# Patient Record
Sex: MALE | Race: WHITE | HISPANIC OR LATINO | Employment: FULL TIME | URBAN - METROPOLITAN AREA
[De-identification: names, ages, dates, MRNs, and addresses within clinical notes are randomized per-mention and may not be internally consistent; named-entity substitution may affect disease eponyms.]

---

## 2017-12-16 ENCOUNTER — APPOINTMENT (OUTPATIENT)
Dept: RADIOLOGY | Facility: MEDICAL CENTER | Age: 32
End: 2017-12-16
Attending: EMERGENCY MEDICINE

## 2017-12-16 ENCOUNTER — HOSPITAL ENCOUNTER (OUTPATIENT)
Facility: MEDICAL CENTER | Age: 32
End: 2017-12-16
Attending: EMERGENCY MEDICINE | Admitting: PLASTIC SURGERY

## 2017-12-16 VITALS
HEART RATE: 85 BPM | TEMPERATURE: 98.9 F | DIASTOLIC BLOOD PRESSURE: 92 MMHG | WEIGHT: 176.15 LBS | OXYGEN SATURATION: 95 % | RESPIRATION RATE: 18 BRPM | SYSTOLIC BLOOD PRESSURE: 151 MMHG | BODY MASS INDEX: 26.7 KG/M2 | HEIGHT: 68 IN

## 2017-12-16 DIAGNOSIS — S02.92XA MULTIPLE CLOSED FRACTURES OF FACIAL BONE, INITIAL ENCOUNTER (HCC): ICD-10-CM

## 2017-12-16 DIAGNOSIS — S02.2XXB OPEN FRACTURE OF NASAL BONE, INITIAL ENCOUNTER: ICD-10-CM

## 2017-12-16 DIAGNOSIS — S01.81XA FACIAL LACERATION, INITIAL ENCOUNTER: ICD-10-CM

## 2017-12-16 DIAGNOSIS — S61.411A LACERATION OF RIGHT HAND, FOREIGN BODY PRESENCE UNSPECIFIED, INITIAL ENCOUNTER: ICD-10-CM

## 2017-12-16 PROCEDURE — 90715 TDAP VACCINE 7 YRS/> IM: CPT | Performed by: EMERGENCY MEDICINE

## 2017-12-16 PROCEDURE — 99285 EMERGENCY DEPT VISIT HI MDM: CPT

## 2017-12-16 PROCEDURE — 160036 HCHG PACU - EA ADDL 30 MINS PHASE I: Performed by: PLASTIC SURGERY

## 2017-12-16 PROCEDURE — 96374 THER/PROPH/DIAG INJ IV PUSH: CPT

## 2017-12-16 PROCEDURE — 160048 HCHG OR STATISTICAL LEVEL 1-5: Performed by: PLASTIC SURGERY

## 2017-12-16 PROCEDURE — 700102 HCHG RX REV CODE 250 W/ 637 OVERRIDE(OP): Performed by: EMERGENCY MEDICINE

## 2017-12-16 PROCEDURE — 160029 HCHG SURGERY MINUTES - 1ST 30 MINS LEVEL 4: Performed by: PLASTIC SURGERY

## 2017-12-16 PROCEDURE — A9270 NON-COVERED ITEM OR SERVICE: HCPCS | Performed by: EMERGENCY MEDICINE

## 2017-12-16 PROCEDURE — 501330 HCHG SET, CYSTO IRRIG TUBING: Performed by: PLASTIC SURGERY

## 2017-12-16 PROCEDURE — 72125 CT NECK SPINE W/O DYE: CPT

## 2017-12-16 PROCEDURE — 73130 X-RAY EXAM OF HAND: CPT | Mod: RT

## 2017-12-16 PROCEDURE — 502000 HCHG MISC OR IMPLANTS RC 0278: Performed by: PLASTIC SURGERY

## 2017-12-16 PROCEDURE — A6222 GAUZE <=16 IN NO W/SAL W/O B: HCPCS | Performed by: PLASTIC SURGERY

## 2017-12-16 PROCEDURE — 160009 HCHG ANES TIME/MIN: Performed by: PLASTIC SURGERY

## 2017-12-16 PROCEDURE — 700105 HCHG RX REV CODE 258: Performed by: EMERGENCY MEDICINE

## 2017-12-16 PROCEDURE — 700102 HCHG RX REV CODE 250 W/ 637 OVERRIDE(OP)

## 2017-12-16 PROCEDURE — 501404 HCHG SPLINT, NASAL DOYLE AIRWAY: Performed by: PLASTIC SURGERY

## 2017-12-16 PROCEDURE — A9270 NON-COVERED ITEM OR SERVICE: HCPCS

## 2017-12-16 PROCEDURE — 700111 HCHG RX REV CODE 636 W/ 250 OVERRIDE (IP): Performed by: EMERGENCY MEDICINE

## 2017-12-16 PROCEDURE — 700111 HCHG RX REV CODE 636 W/ 250 OVERRIDE (IP)

## 2017-12-16 PROCEDURE — 70450 CT HEAD/BRAIN W/O DYE: CPT

## 2017-12-16 PROCEDURE — G0378 HOSPITAL OBSERVATION PER HR: HCPCS

## 2017-12-16 PROCEDURE — 500331 HCHG COTTONOID, SURG PATTIE: Performed by: PLASTIC SURGERY

## 2017-12-16 PROCEDURE — 90471 IMMUNIZATION ADMIN: CPT

## 2017-12-16 PROCEDURE — 501838 HCHG SUTURE GENERAL: Performed by: PLASTIC SURGERY

## 2017-12-16 PROCEDURE — 96375 TX/PRO/DX INJ NEW DRUG ADDON: CPT

## 2017-12-16 PROCEDURE — 501403 HCHG SPLINT, NASAL DENVER: Performed by: PLASTIC SURGERY

## 2017-12-16 PROCEDURE — 160041 HCHG SURGERY MINUTES - EA ADDL 1 MIN LEVEL 4: Performed by: PLASTIC SURGERY

## 2017-12-16 PROCEDURE — 160002 HCHG RECOVERY MINUTES (STAT): Performed by: PLASTIC SURGERY

## 2017-12-16 PROCEDURE — 500126 HCHG BOVIE, NEEDLE TIP: Performed by: PLASTIC SURGERY

## 2017-12-16 PROCEDURE — 160035 HCHG PACU - 1ST 60 MINS PHASE I: Performed by: PLASTIC SURGERY

## 2017-12-16 PROCEDURE — 700101 HCHG RX REV CODE 250

## 2017-12-16 PROCEDURE — 70486 CT MAXILLOFACIAL W/O DYE: CPT

## 2017-12-16 DEVICE — IMPLANTABLE DEVICE: Type: IMPLANTABLE DEVICE | Status: FUNCTIONAL

## 2017-12-16 RX ORDER — AMOXICILLIN AND CLAVULANATE POTASSIUM 875; 125 MG/1; MG/1
1 TABLET, FILM COATED ORAL ONCE
Status: COMPLETED | OUTPATIENT
Start: 2017-12-16 | End: 2017-12-16

## 2017-12-16 RX ORDER — SODIUM CHLORIDE 9 MG/ML
INJECTION, SOLUTION INTRAVENOUS CONTINUOUS
Status: DISCONTINUED | OUTPATIENT
Start: 2017-12-16 | End: 2017-12-16

## 2017-12-16 RX ORDER — LIDOCAINE HYDROCHLORIDE 10 MG/ML
INJECTION, SOLUTION INFILTRATION; PERINEURAL
Status: COMPLETED
Start: 2017-12-16 | End: 2017-12-16

## 2017-12-16 RX ORDER — AMOXICILLIN AND CLAVULANATE POTASSIUM 875; 125 MG/1; MG/1
1 TABLET, FILM COATED ORAL 2 TIMES DAILY
Qty: 14 TAB | Refills: 0 | Status: SHIPPED | OUTPATIENT
Start: 2017-12-16

## 2017-12-16 RX ORDER — ONDANSETRON 2 MG/ML
4 INJECTION INTRAMUSCULAR; INTRAVENOUS EVERY 4 HOURS PRN
Status: DISCONTINUED | OUTPATIENT
Start: 2017-12-16 | End: 2017-12-16 | Stop reason: HOSPADM

## 2017-12-16 RX ORDER — ONDANSETRON 2 MG/ML
4 INJECTION INTRAMUSCULAR; INTRAVENOUS
Status: COMPLETED | OUTPATIENT
Start: 2017-12-16 | End: 2017-12-16

## 2017-12-16 RX ORDER — SODIUM CHLORIDE 9 MG/ML
INJECTION, SOLUTION INTRAVENOUS CONTINUOUS
Status: DISCONTINUED | OUTPATIENT
Start: 2017-12-16 | End: 2017-12-16 | Stop reason: HOSPADM

## 2017-12-16 RX ORDER — SODIUM CHLORIDE, SODIUM LACTATE, POTASSIUM CHLORIDE, CALCIUM CHLORIDE 600; 310; 30; 20 MG/100ML; MG/100ML; MG/100ML; MG/100ML
INJECTION, SOLUTION INTRAVENOUS
Status: ACTIVE | OUTPATIENT
Start: 2017-12-16 | End: 2017-12-16

## 2017-12-16 RX ORDER — OXYCODONE HYDROCHLORIDE 10 MG/1
10 TABLET ORAL
Status: DISCONTINUED | OUTPATIENT
Start: 2017-12-16 | End: 2017-12-16 | Stop reason: HOSPADM

## 2017-12-16 RX ORDER — MORPHINE SULFATE 10 MG/ML
5 INJECTION, SOLUTION INTRAMUSCULAR; INTRAVENOUS
Status: DISCONTINUED | OUTPATIENT
Start: 2017-12-16 | End: 2017-12-16 | Stop reason: HOSPADM

## 2017-12-16 RX ORDER — ACETAMINOPHEN 650 MG/1
650 SUPPOSITORY RECTAL EVERY 4 HOURS PRN
Status: DISCONTINUED | OUTPATIENT
Start: 2017-12-16 | End: 2017-12-16 | Stop reason: HOSPADM

## 2017-12-16 RX ORDER — BUPIVACAINE HYDROCHLORIDE AND EPINEPHRINE 5; 5 MG/ML; UG/ML
INJECTION, SOLUTION EPIDURAL; INTRACAUDAL; PERINEURAL
Status: DISCONTINUED | OUTPATIENT
Start: 2017-12-16 | End: 2017-12-16 | Stop reason: HOSPADM

## 2017-12-16 RX ORDER — OXYCODONE AND ACETAMINOPHEN 10; 325 MG/1; MG/1
1-2 TABLET ORAL EVERY 4 HOURS PRN
Qty: 25 TAB | Refills: 0 | Status: SHIPPED | OUTPATIENT
Start: 2017-12-16 | End: 2017-12-21

## 2017-12-16 RX ORDER — ACETAMINOPHEN 325 MG/1
325 TABLET ORAL EVERY 4 HOURS PRN
Status: DISCONTINUED | OUTPATIENT
Start: 2017-12-16 | End: 2017-12-16 | Stop reason: HOSPADM

## 2017-12-16 RX ORDER — HYDROMORPHONE HYDROCHLORIDE 2 MG/ML
1 INJECTION, SOLUTION INTRAMUSCULAR; INTRAVENOUS; SUBCUTANEOUS
Status: COMPLETED | OUTPATIENT
Start: 2017-12-16 | End: 2017-12-16

## 2017-12-16 RX ADMIN — SODIUM CHLORIDE: 9 INJECTION, SOLUTION INTRAVENOUS at 10:00

## 2017-12-16 RX ADMIN — CLOSTRIDIUM TETANI TOXOID ANTIGEN (FORMALDEHYDE INACTIVATED), CORYNEBACTERIUM DIPHTHERIAE TOXOID ANTIGEN (FORMALDEHYDE INACTIVATED), BORDETELLA PERTUSSIS TOXOID ANTIGEN (GLUTARALDEHYDE INACTIVATED), BORDETELLA PERTUSSIS FILAMENTOUS HEMAGGLUTININ ANTIGEN (FORMALDEHYDE INACTIVATED), BORDETELLA PERTUSSIS PERTACTIN ANTIGEN, AND BORDETELLA PERTUSSIS FIMBRIAE 2/3 ANTIGEN 0.5 ML: 5; 2; 2.5; 5; 3; 5 INJECTION, SUSPENSION INTRAMUSCULAR at 06:35

## 2017-12-16 RX ADMIN — HYDROMORPHONE HYDROCHLORIDE 1 MG: 2 INJECTION INTRAMUSCULAR; INTRAVENOUS; SUBCUTANEOUS at 11:28

## 2017-12-16 RX ADMIN — ONDANSETRON 4 MG: 2 INJECTION INTRAMUSCULAR; INTRAVENOUS at 11:27

## 2017-12-16 RX ADMIN — AMOXICILLIN AND CLAVULANATE POTASSIUM 1 TABLET: 875; 125 TABLET, FILM COATED ORAL at 07:00

## 2017-12-16 RX ADMIN — SODIUM CHLORIDE: 9 INJECTION, SOLUTION INTRAVENOUS at 12:22

## 2017-12-16 RX ADMIN — LIDOCAINE HYDROCHLORIDE: 10 INJECTION, SOLUTION INFILTRATION; PERINEURAL at 06:30

## 2017-12-16 ASSESSMENT — LIFESTYLE VARIABLES
EVER_SMOKED: NEVER
ALCOHOL_USE: YES
HAVE PEOPLE ANNOYED YOU BY CRITICIZING YOUR DRINKING: NO
CONSUMPTION TOTAL: POSITIVE
TOTAL SCORE: 0
TOTAL SCORE: 0
HAVE YOU EVER FELT YOU SHOULD CUT DOWN ON YOUR DRINKING: NO
AVERAGE NUMBER OF DAYS PER WEEK YOU HAVE A DRINK CONTAINING ALCOHOL: 1
TOTAL SCORE: 0
EVER HAD A DRINK FIRST THING IN THE MORNING TO STEADY YOUR NERVES TO GET RID OF A HANGOVER: NO
ON A TYPICAL DAY WHEN YOU DRINK ALCOHOL HOW MANY DRINKS DO YOU HAVE: 7
HOW MANY TIMES IN THE PAST YEAR HAVE YOU HAD 5 OR MORE DRINKS IN A DAY: 10
EVER FELT BAD OR GUILTY ABOUT YOUR DRINKING: NO

## 2017-12-16 ASSESSMENT — COGNITIVE AND FUNCTIONAL STATUS - GENERAL
SUGGESTED CMS G CODE MODIFIER MOBILITY: CH
EATING MEALS: A LOT
SUGGESTED CMS G CODE MODIFIER DAILY ACTIVITY: CJ
MOBILITY SCORE: 24
DAILY ACTIVITIY SCORE: 22

## 2017-12-16 ASSESSMENT — PAIN SCALES - GENERAL
PAINLEVEL_OUTOF10: 5
PAINLEVEL_OUTOF10: 2
PAINLEVEL_OUTOF10: 2
PAINLEVEL_OUTOF10: 0
PAINLEVEL_OUTOF10: 1
PAINLEVEL_OUTOF10: 0
PAINLEVEL_OUTOF10: 7
PAINLEVEL_OUTOF10: 0

## 2017-12-16 ASSESSMENT — PATIENT HEALTH QUESTIONNAIRE - PHQ9
1. LITTLE INTEREST OR PLEASURE IN DOING THINGS: NOT AT ALL
SUM OF ALL RESPONSES TO PHQ QUESTIONS 1-9: 0
2. FEELING DOWN, DEPRESSED, IRRITABLE, OR HOPELESS: NOT AT ALL
SUM OF ALL RESPONSES TO PHQ9 QUESTIONS 1 AND 2: 0
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
## 2017-12-16 NOTE — ED NOTES
"Pts friend states, \"he can't talk he needs lots of pain meds for his body\".  I ask the patient if he is in pain an he reports in my left hand at an 8/10.  Pts can speak in full sentences, denies difficulty speaking and states he no trouble speaking.  I politely explain that I have to assess the patient and I appreciate the help from the friend.  Family is pleased and patient is resting comfortably.   "
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
## 2017-12-16 NOTE — OR SURGEON
Immediate Post OP Note    PreOp Diagnosis: left medial orbital wall fracture, complex nasal/septal fractures, laceration left eyelid and forehead, right open hand wound    PostOp Diagnosis: same    Procedure(s):  ORBITAL FRACTURE ORIF  NASAL FRACTURE REDUCTION OPEN  Washout and closure complex right hand wound and EIP repair  Closure left eyelid and forehead wounds    Surgeon(s):  Rodríguez Garcia M.D.    Anesthesiologist/Type of Anesthesia:  Anesthesiologist: Cesar Farfan M.D./* No anesthesia type entered *    Surgical Staff:  Circulator: Jody Lino R.N.; Laura Argueta R.N.  Scrub Person: Kg Hammer    Specimens:  * No specimens in log *    Estimated Blood Loss: minimal    Findings: see operative note    Complications: no apparent    #[id number]    12/16/2017 1:35 PM Rodríguez Garcia

## 2017-12-16 NOTE — ED NOTES
Med rec complete per patient.  Pt denies taking any medication, OTC or Rx.  Allergies reviewed - NKDA.
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
## 2017-12-17 NOTE — DISCHARGE INSTRUCTIONS
"Orbital Floor Fracture, Non-Blowout  The eye sits in the part of the skull called the \"orbit.\" The upper and outside walls of the orbit are thick and strong. The inside wall (near the nose) and the orbital floor are very thin and weak. The tissues around the eye will briefly press together if there is a direct blow to the front of the eye. This leads to high pressure against the orbital walls. The inside wall and the orbital floor may break since these are the weakest walls. If the orbital floor breaks, the tissues around the eye, including the muscle that makes the eye look down, may become trapped in the sinus below when the orbital floor \"blows out.\" If a blowout does not happen, the orbital floor fracture is considered a non-blowout orbital fracture.  CAUSES  An orbital floor fracture can be caused by any accident in which an object hits the face or the face strikes against a hard object. The most common ways that people break their eye socket include:  Being hit by a blunt object, such as a baseball bat or a fist.Discharge Instructions    Discharged to home by car with relative. Discharged via wheelchair, hospital escort: Refused.  Special equipment needed: Not Applicable    Be sure to schedule a follow-up appointment with your primary care doctor or any specialists as instructed.     Discharge Plan:   Influenza Vaccine Indication: Patient Refuses    I understand that a diet low in cholesterol, fat, and sodium is recommended for good health. Unless I have been given specific instructions below for another diet, I accept this instruction as my diet prescription.   Other diet: Regular as tolerated     Special Instructions: None    · Is patient discharged on Warfarin / Coumadin?   No     · Is patient Post Blood Transfusion?  No    Depression / Suicide Risk    As you are discharged from this [facility] facility, it is important to learn how to keep safe from harming yourself.    Recognize the warning " signs:  · Abrupt changes in personality, positive or negative- including increase in energy   · Giving away possessions  · Change in eating patterns- significant weight changes-  positive or negative  · Change in sleeping patterns- unable to sleep or sleeping all the time   · Unwillingness or inability to communicate  · Depression  · Unusual sadness, discouragement and loneliness  · Talk of wanting to die  · Neglect of personal appearance   · Rebelliousness- reckless behavior  · Withdrawal from people/activities they love  · Confusion- inability to concentrate     If you or a loved one observes any of these behaviors or has concerns about self-harm, here's what you can do:  · Talk about it- your feelings and reasons for harming yourself  · Remove any means that you might use to hurt yourself (examples: pills, rope, extension cords, firearm)  · Get professional help from the community (Mental Health, Substance Abuse, psychological counseling)  · Do not be alone:Call your Safe Contact- someone whom you trust who will be there for you.  · Call your local CRISIS HOTLINE 653-5093 or 080-106-3705  · Call your local Children's Mobile Crisis Response Team [place] (495) 864-7447 or www.[employer]  · Call the toll free National Suicide Prevention Hotlines   · National Suicide Prevention Lifeline 750-938-ULGI (1059)  · National Hope Line Network 800-SUICIDE (997-0694)    ·   · Striking the face on the car dashboard during a crash.  · Falls.  Gunshot.  Nasal Surgery  Care After  These instructions give you information on caring for yourself after your procedure. Your doctor may also give you more specific instructions. Call your doctor if you have any problems or questions after your procedure.  HOME CARE  · Sleep with your head up on 2 or 3 pillows.  · Hold a cold, damp wash cloth to your face, eyes, and nose for 20 minutes, every 2 hours during the day.  · Wash your face very gently with mild soap and water 2  times a day.  · Change your nasal drip pad as often as you need to.  · Do not bend over or lift more than 10 pounds (about the weight of a gallon of milk).  · Do not chew gum or eat any food that needs a lot of chewing.  · Do not bump or hurt your nose.  · Do not blow your nose for 1 week.  · Do not sniff or sneeze unless your mouth is open.  · Be sure to keep your follow-up appointment.  GET HELP RIGHT AWAY IF:   · You have any questions or concerns.  · You or your child has a temperature by mouth about 102° F (38.9° C).  · You notice bad smelling fluid (drainage) coming from the surgery site (incision) or your nose.  MAKE SURE YOU:  · Understand these instructions.  · Will watch your condition.  · Will get help right away if you are not doing well or get worse.  Document Released: 03/16/2010 Document Revised: 03/11/2013 Document Reviewed: 03/16/2010  [email or web address]® Patient Information ©2014 [employer].  Nasal Septal Reconstruction  Nasal septal reconstruction or nasal septoplasty is a procedure to straighten the nasal septum. The nasal septum is a wall that separates the two nostrils and nasal passages. It is slightly off center in most people. If the septum is severely deviated, it may result in problems, such as difficulty in breathing through the nose. The bend in the septum may be present at birth or could be due to an injury. This procedure is done if you have any of the following problems.  · Deviation of the nasal septum.  · Repeated infection of the sinuses (air-filled cavities in the skull).  · Pain due to the deviated septum.  · Loss of smell due to the deviated septum.  · A blood clot in the septum that interferes your breathing.  · Frequent nosebleeds.  If the outside of the nose is bent, it may have to be reconstructed by a surgery called rhinoplasty. Sometimes, this procedure may be combined with septoplasty.  LET YOUR CAREGIVER KNOW ABOUT:   · Allergies.  · Previous problems with  anesthetics.  · History of bleeding or blood problems.  · Any medicines that you are currently taking.  RISKS AND COMPLICATIONS  · You may have a hole in the septum.  · You may have a collection of blood in the septum.  · You may develop loss of sensation in the upper lip or teeth.  · You may develop an infection.  · You may have bleeding.  · The front portion of your nose may become flatter than what it was before the procedure.  · You may develop a reaction to the anesthetic used.  · You may have a recurrence of the nasal obstruction.  BEFORE THE PROCEDURE   · Follow the instructions given by your caregiver.  · Your caregiver may recommend x-ray and blood tests.  · Your caregiver may advise you to stop smoking for at least 2 weeks before the procedure.  · Your caregiver may advise you to stop taking aspirin and anti-inflammatory medications such as ibuprofen, 10 days before surgery, as these medicines can cause bleeding.  If the surgery is going to be under general anesthesia:  · You may be advised to eat only a light meal, such as soup or salad the previous night.  · You will be advised to avoid eating or drinking anything after midnight and also in the morning of the procedure.  PROCEDURE   If the procedure is being done under general anesthesia, you may be put to sleep. You will not feel the pain. You will not be aware of the procedure. It can also be done under local anesthesia with sedation where the area of the surgery is numbed. The surgeon then makes a cut on the inner lining of the septum. If there is a blood clot, it is drained. The bone and cartilage of the septum are reshaped. The straightened septum is held in place using plastic sheets or splints. Your nose is then packed with gauze to control the bleeding. The procedure may take one to one and a half hours. It generally does not cause bruising or black eyes.  AFTER THE PROCEDURE   · You may be kept in the recovery room till the effect of the  anesthesia wears off.  · You may be then brought to your room in the hospital.  · You may be asked to breathe through your mouth.  · Your nose packing may need to stay in place for 3 to 4 days.  · You may be given medicines for discomfort and nausea.  · You may be given antibiotics.  · You may be allowed to go home on the same day or have to stay in the hospital for a few days.  HOME CARE INSTRUCTIONS   · Do not blow your nose.  · Avoid doing heavy work and strenuous exercise for at least one week after the procedure.  · Avoid pushing or moving your nose before it heals.  · Avoid lifting weight and bending forwards.  · Avoid using products that contain aspirin.  · Keep your head raised while lying down.  · Take the medicines as instructed by your caregiver.  · Inform your caregiver if you have any problems after taking your medicine.  SEEK MEDICAL CARE IF:   · You have a new symptom.  · You have doubts regarding the procedure or its outcome.  SEEK IMMEDIATE MEDICAL CARE IF:   · You develop fever over 102° F (38.9° C).  · You have severe difficulty in breathing.  · Your nose continues to bleed even after you keep your head raised and apply ice to your forehead and nose for 10 to 15 minutes.  Document Released: 03/26/2009 Document Revised: 03/11/2013 Document Reviewed: 03/26/2009  [employer]® Patient Information ©2014 [employer]  ·   SYMPTOMS   If there has been no injury to the eye itself, symptoms may include:  · Puffiness (swelling) and bruising around the eye area (black eye).  · Numbness of the cheek and upper gum on the side with the floor fracture. This is caused by nerve injury to these areas.  · Pain around the eye.  · Headache.  · Ear pain on the injured side.  DIAGNOSIS  The diagnosis of an orbital floor fracture is suspected during an eye exam by an ophthalmologist. It is confirmed by X-rays or CT scan.  TREATMENT  Your caregiver may suggest waiting 1 or 2 weeks for the swelling to go away before  examining the eye. When the swelling lessens, your caregiver will examine the eye to see if there is any sign of a trapped muscle or double vision when looking in different directions. If double vision is not found and muscle or tissue did not get trapped, no further treatment is necessary. After that, in almost all cases, the bones heal together on their own.   HOME CARE INSTRUCTIONS  · Take all pain medicine as directed by your caregiver.  · Use ice packs or other cold therapy to reduce swelling as directed by your caregiver.  · Do not put a contact lens in the injured eye until your caregiver approves.  · Avoid [place] environments.  · Always wear protective glasses or goggles when recommended. Wearing protective eyewear is not dangerous to your injured eye and will not delay healing.  · As long as your other eye is seeing normally, you may return to work and drive.  · You may travel by plane or be in high altitudes. However, your swelling may take longer to go away, and you may have sinus pain.  · Be aware that your depth perception and your ability to  distance may be reduced or lost.  SEEK IMMEDIATE MEDICAL CARE IF:  · Your vision changes.  · Your redness or swelling persists around the injured eye or gets worse.  · You start to have double vision.  · You have a bloody or discolored discharge from your nose.  · You have a fever that lasts longer than 2 to 3 days.  · You have a fever that suddenly gets worse.  · Your cheek or upper gum numbness does not go away.  MAKE SURE YOU:  · Understand these instructions.  · Will watch your condition.  · Will get help right away if you are not doing well or get worse.     This information is not intended to replace advice given to you by your health care provider. Make sure you discuss any questions you have with your health care provider.     Document Released: 03/11/2013 Document Revised: 01/08/2016 Document Reviewed: 03/11/2013  [employer]  Education ©2016 [employer]

## (undated) DEVICE — PATTIES SURG X-RAYCOTTONOID - 1/2 X 3 IN (200/CA)

## (undated) DEVICE — SLEEVE, VASO, THIGH, MED

## (undated) DEVICE — SPLINT NASAL DENVER SM -SERIES 4000 (5EA/BX)

## (undated) DEVICE — NEEDLE NON SAFETY 25 GA X 1 1/2 IN HYPO (100EA/BX)

## (undated) DEVICE — TUBING CLEARLINK DUO-VENT - C-FLO (48EA/CA)

## (undated) DEVICE — ELECTRODE DUAL RETURN W/ CORD - (50/PK)

## (undated) DEVICE — BOVIE NEEDLE TIP INSULATD NON-SAFETY 2CM (50/PK)

## (undated) DEVICE — HEAD HOLDER JUNIOR/ADULT

## (undated) DEVICE — TOWELS CLOTH SURGICAL - (4/PK 20PK/CA)

## (undated) DEVICE — SET IRRIGATION CYSTOSCOPY TUBE L80 IN (20EA/CA)

## (undated) DEVICE — SUTURE 5-0 VICRYL PLUS P-3 18 (36PK/BX)"

## (undated) DEVICE — PROTECTOR CORNEAL - (10/BX)

## (undated) DEVICE — BLADE SURGICAL #15 - (50/BX 3BX/CA)

## (undated) DEVICE — ELECTRODE 850 FOAM ADHESIVE - HYDROGEL RADIOTRNSPRNT (50/PK)

## (undated) DEVICE — CORDS BIPOLAR COAGULATION - 12FT STERILE DISP. (10EA/BX)

## (undated) DEVICE — SUTURE GENERAL

## (undated) DEVICE — GLOVE BIOGEL SZ 7.5 SURGICAL PF LTX - (50PR/BX 4BX/CA)

## (undated) DEVICE — CANISTER SUCTION 3000ML MECHANICAL FILTER AUTO SHUTOFF MEDI-VAC NONSTERILE LF DISP  (40EA/CA)

## (undated) DEVICE — GLOVE BIOGEL INDICATOR SZ 8.5 SURGICAL PF LTX - (50/BX 4BX/CA)

## (undated) DEVICE — SENSOR SPO2 NEO LNCS ADHESIVE (20/BX) SEE USER NOTES

## (undated) DEVICE — SPLINT NASAL DOYLE AIRWAY (2EA/ST)

## (undated) DEVICE — PROTECTOR ULNA NERVE - (36PR/CA)

## (undated) DEVICE — SUTURE 4-0 ETHILON FS-2 18 (36PK/BX)"

## (undated) DEVICE — MASK ANESTHESIA ADULT  - (100/CA)

## (undated) DEVICE — KIT ROOM DECONTAMINATION

## (undated) DEVICE — PACK MINOR BASIN - (2EA/CA)

## (undated) DEVICE — SET LEADWIRE 5 LEAD BEDSIDE DISPOSABLE ECG (1SET OF 5/EA)

## (undated) DEVICE — DRESSING XEROFORM 1X8 - (50/BX 4BX/CA)

## (undated) DEVICE — GLOVE BIOGEL SZ 8 SURGICAL PF LTX - (50PR/BX 4BX/CA)

## (undated) DEVICE — TUBE E-T HI-LO CUFF 7.0MM (10EA/PK)

## (undated) DEVICE — SET EXTENSION WITH 2 PORTS (48EA/CA) ***PART #2C8610 IS A SUBSTITUTE*****

## (undated) DEVICE — SUTURE 3-0 ETHILON FS-1 - (36/BX) 30 INCH

## (undated) DEVICE — DRAPE SURGICAL U 77X120 - (10/CA)

## (undated) DEVICE — BANDAGE STERILE 2 IN X 75 IN (12EA/BX 8BX/CA)

## (undated) DEVICE — SUCTION INSTRUMENT YANKAUER BULBOUS TIP W/O VENT (50EA/CA)

## (undated) DEVICE — DRAPE LARGE 3 QUARTER - (20/CA)

## (undated) DEVICE — NEPTUNE 4 PORT MANIFOLD - (20/PK)

## (undated) DEVICE — BANDAGE ELASTIC 3 X 5 YDS - STERILE VELCRO (25/CA)LATEX

## (undated) DEVICE — TRAY SRGPRP PVP IOD WT PRP - (20/CA)

## (undated) DEVICE — KIT ANESTHESIA W/CIRCUIT & 3/LT BAG W/FILTER (20EA/CA)

## (undated) DEVICE — GOWN SURGEONS X-LARGE - DISP. (30/CA)

## (undated) DEVICE — SPONGE GAUZESTER 4 X 4 4PLY - (128PK/CA)

## (undated) DEVICE — SUTURE 4-0 VICRYL PLUS RB-1 - 27 INCH (36/BX)